# Patient Record
Sex: FEMALE | Race: WHITE | URBAN - METROPOLITAN AREA
[De-identification: names, ages, dates, MRNs, and addresses within clinical notes are randomized per-mention and may not be internally consistent; named-entity substitution may affect disease eponyms.]

---

## 2022-10-25 ENCOUNTER — APPOINTMENT (RX ONLY)
Dept: URBAN - METROPOLITAN AREA CLINIC 45 | Facility: CLINIC | Age: 67
Setting detail: DERMATOLOGY
End: 2022-10-25

## 2022-10-25 DIAGNOSIS — L57.0 ACTINIC KERATOSIS: ICD-10-CM

## 2022-10-25 DIAGNOSIS — L57.8 OTHER SKIN CHANGES DUE TO CHRONIC EXPOSURE TO NONIONIZING RADIATION: ICD-10-CM | Status: INADEQUATELY CONTROLLED

## 2022-10-25 DIAGNOSIS — B07.8 OTHER VIRAL WARTS: ICD-10-CM

## 2022-10-25 PROBLEM — D48.5 NEOPLASM OF UNCERTAIN BEHAVIOR OF SKIN: Status: ACTIVE | Noted: 2022-10-25

## 2022-10-25 PROCEDURE — 11103 TANGNTL BX SKIN EA SEP/ADDL: CPT | Mod: 59

## 2022-10-25 PROCEDURE — ? MEDICATION COUNSELING

## 2022-10-25 PROCEDURE — ? LIQUID NITROGEN

## 2022-10-25 PROCEDURE — 11102 TANGNTL BX SKIN SINGLE LES: CPT | Mod: 59

## 2022-10-25 PROCEDURE — 17110 DESTRUCTION B9 LES UP TO 14: CPT

## 2022-10-25 PROCEDURE — ? PRESCRIPTION

## 2022-10-25 PROCEDURE — ? ADDITIONAL NOTES

## 2022-10-25 PROCEDURE — ? COUNSELING

## 2022-10-25 PROCEDURE — ? BIOPSY BY SHAVE METHOD

## 2022-10-25 PROCEDURE — 17003 DESTRUCT PREMALG LES 2-14: CPT | Mod: 59

## 2022-10-25 PROCEDURE — 99204 OFFICE O/P NEW MOD 45 MIN: CPT | Mod: 25

## 2022-10-25 PROCEDURE — ? PRESCRIPTION MEDICATION MANAGEMENT

## 2022-10-25 PROCEDURE — 17000 DESTRUCT PREMALG LESION: CPT | Mod: 59

## 2022-10-25 RX ORDER — FLUOROURACIL 5 MG/G
CREAM TOPICAL BID
Qty: 40 | Refills: 1 | Status: ERX | COMMUNITY
Start: 2022-10-25

## 2022-10-25 RX ADMIN — FLUOROURACIL: 5 CREAM TOPICAL at 00:00

## 2022-10-25 ASSESSMENT — LOCATION ZONE DERM
LOCATION ZONE: FACE
LOCATION ZONE: FINGER

## 2022-10-25 ASSESSMENT — LOCATION DETAILED DESCRIPTION DERM
LOCATION DETAILED: RIGHT MID TEMPLE
LOCATION DETAILED: GLABELLA
LOCATION DETAILED: LEFT FOREHEAD
LOCATION DETAILED: RIGHT INFERIOR MEDIAL FOREHEAD
LOCATION DETAILED: RIGHT FOREHEAD
LOCATION DETAILED: RIGHT LATERAL FOREHEAD
LOCATION DETAILED: LEFT LATERAL FOREHEAD
LOCATION DETAILED: RIGHT MID DORSAL RING FINGER
LOCATION DETAILED: RIGHT MEDIAL MALAR CHEEK
LOCATION DETAILED: LEFT INFERIOR FOREHEAD
LOCATION DETAILED: LEFT MID TEMPLE
LOCATION DETAILED: RIGHT CENTRAL EYEBROW
LOCATION DETAILED: RIGHT MEDIAL FOREHEAD

## 2022-10-25 ASSESSMENT — LOCATION SIMPLE DESCRIPTION DERM
LOCATION SIMPLE: GLABELLA
LOCATION SIMPLE: RIGHT CHEEK
LOCATION SIMPLE: RIGHT RING FINGER
LOCATION SIMPLE: LEFT FOREHEAD
LOCATION SIMPLE: RIGHT EYEBROW
LOCATION SIMPLE: RIGHT TEMPLE
LOCATION SIMPLE: RIGHT FOREHEAD
LOCATION SIMPLE: LEFT TEMPLE

## 2022-10-25 NOTE — PROCEDURE: LIQUID NITROGEN
Show Aperture Variable?: Yes
Post-Care Instructions: I reviewed with the patient in detail post-care instructions. Patient is to wear sunprotection, and avoid picking at any of the treated lesions. Pt may apply Vaseline to crusted or scabbing areas.
Consent: The patient's consent was obtained including but not limited to risks of crusting, scabbing, blistering, scarring, darker or lighter pigmentary change, recurrence, incomplete removal and infection.
Render Note In Bullet Format When Appropriate: No
Duration Of Freeze Thaw-Cycle (Seconds): 0
Detail Level: Detailed
Medical Necessity Information: It is in your best interest to select a reason for this procedure from the list below. All of these items fulfill various CMS LCD requirements except the new and changing color options.
Medical Necessity Clause: This procedure was medically necessary because the lesions that were treated were:
Spray Paint Text: The liquid nitrogen was applied to the skin utilizing a spray paint frosting technique.

## 2022-10-25 NOTE — PROCEDURE: MEDICATION COUNSELING
Right renal angiogram Erythromycin Pregnancy And Lactation Text: This medication is Pregnancy Category B and is considered safe during pregnancy. It is also excreted in breast milk.

## 2022-10-25 NOTE — PROCEDURE: MEDICATION COUNSELING
Xelmelyz Pregnancy And Lactation Text: This medication is Pregnancy Category D and is not considered safe during pregnancy.  The risk during breast feeding is also uncertain.

## 2022-10-25 NOTE — PROCEDURE: PRESCRIPTION MEDICATION MANAGEMENT
Render In Strict Bullet Format?: No
Initiate Treatment: fluorouracil 5 % topical cream Bid\\nQuantity: 40.0 g  Days Supply: 30\\nSig: Apply a small amount to forehead bid x 2 weeks
Detail Level: Zone

## 2022-10-25 NOTE — HPI: SKIN LESION
What Type Of Note Output Would You Prefer (Optional)?: Bullet Format
How Severe Is Your Skin Lesion?: mild
Has Your Skin Lesion Been Treated?: not been treated
Is This A New Presentation, Or A Follow-Up?: Skin Lesions
Additional History: New patient seeing Cassandra today  (has been seen a while ago with NGD before EMA) \\n\\n\\nPatient is here for a few dry spots on forehead and her nose.  Pt declined a full body skin check.

## 2022-10-25 NOTE — PROCEDURE: ADDITIONAL NOTES
Past Medical History:   Diagnosis Date    AR (allergic rhinitis) 11/29/2012    Benign hypertension     Depression     Diverticulosis 2005    Esophageal polyp     Fibromyalgia     Gastric ulcer; benign 2007    GERD (gastroesophageal reflux disease) 11/29/2012    Hypercholesteremia     Hypothyroidism     Kidney stones     Obstructive sleep apnea on CPAP     Osteopenia     Rectal polyp     Skin cancer     pre skin cancer skin    Vulvovaginal melanosis        Past Surgical History:   Procedure Laterality Date    CHOLECYSTECTOMY  08/08/2012    CHOLECYSTECTOMY      COLONOSCOPY N/A 2/6/2017    Procedure: COLONOSCOPY;  Surgeon: Arturo Crane MD;  Location: [facility];  Service: Endoscopy;  Laterality: N/A;    FOOT FRACTURE SURGERY  2005    plate in right foot    GANGLION CYST EXCISION  1980    left wrist    HYSTERECTOMY      KNEE ARTHROSCOPY W/ ACL RECONSTRUCTION      bilateral     NASAL SEPTUM SURGERY      TEAR DUCT SURGERY  2006    left eye    TOTAL KNEE ARTHROPLASTY      bilateral    TOTAL VAGINAL HYSTERECTOMY  age 30    w/BSO    TUBAL LIGATION  age 25       Review of patient's allergies indicates:   Allergen Reactions    Cortisone Shortness Of Breath     Causes heart to race    Hair formula [mv,iron,min-folic acid-biotin]      dye    Levaquin [levofloxacin] Other (See Comments)     Tongue swelling and blisters    Codeine Nausea Only    Doxycycline      Other reaction(s): Unknown       No current facility-administered medications on file prior to encounter.      Current Outpatient Medications on File Prior to Encounter   Medication Sig    B-complex with vitamin C tablet Take 1 tablet by mouth once daily.     co-enzyme Q-10 30 mg capsule Take 30 mg by mouth once daily.     fluoxetine (PROZAC) 10 MG capsule Take 20 mg by mouth once daily.     fluticasone (FLONASE) 50 mcg/actuation nasal spray 1 spray by Each Nostril route daily as needed.     ipratropium (ATROVENT) 0.03 % nasal 
spray 2 sprays by Nasal route daily as needed.     Lactobacillus rhamnosus GG (CULTURELLE) 10 billion cell capsule Take 1 capsule by mouth once daily.    levomefolate calcium (DEPLIN) 7.5 mg Tab tablet Take 15 mg by mouth once daily.     losartan-hydrochlorothiazide 100-25 mg (HYZAAR) 100-25 mg per tablet Take 1 tablet by mouth once daily.     magnesium 30 mg tablet Take 30 mg by mouth once daily.     mv-mn/folic acid/vit [id number] (ALIVE ONCE DAILY WOMEN 50 PLUS ORAL) Take 1 tablet by mouth once daily.    potassium chloride SA (K-DUR,KLOR-CON) 10 MEQ tablet Take 10 mEq by mouth 2 (two) times daily.     terazosin (HYTRIN) 5 MG capsule Take 5 mg by mouth every evening.    TIROSINT 88 mcg Cap Take 88 mcg by mouth once daily.     amlodipine (NORVASC) 5 MG tablet Take 5 mg by mouth once daily.     cetirizine (ZYRTEC) 10 MG tablet Take 10 mg by mouth daily as needed.     escitalopram oxalate (LEXAPRO) 20 MG tablet     loratadine (CLARITIN) 10 mg tablet Take 10 mg by mouth once daily.    meloxicam (MOBIC) 15 MG tablet Take 1 tablet (15 mg total) by mouth once daily.    naproxen sodium (ANAPROX) 220 MG tablet Take 220 mg by mouth daily as needed.      Family History     Problem Relation (Age of Onset)    Heart disease Father    Kidney disease Mother        Tobacco Use    Smoking status: Never Smoker    Smokeless tobacco: Never Used   Substance and Sexual Activity    Alcohol use: No    Drug use: No    Sexual activity: Not Currently     Partners: Male     Birth control/protection: None     Review of Systems   Constitutional: Negative for activity change, appetite change, chills, diaphoresis, fatigue, fever and unexpected weight change.   HENT: Negative for congestion, ear pain, facial swelling, hearing loss, sore throat and trouble swallowing.    Eyes: Negative for pain and discharge.   Respiratory: Negative for cough, chest tightness, shortness of breath and wheezing.    Cardiovascular: Positive for chest 
pain. Negative for palpitations and leg swelling.   Gastrointestinal: Negative for abdominal pain, blood in stool, diarrhea, nausea and vomiting.   Endocrine: Negative for polydipsia, polyphagia and polyuria.   Genitourinary: Negative for difficulty urinating, dysuria, flank pain, frequency and urgency.   Musculoskeletal: Positive for back pain. Negative for arthralgias, joint swelling, neck pain and neck stiffness.   Skin: Negative for rash and wound.   Allergic/Immunologic: Negative for environmental allergies and immunocompromised state.   Neurological: Negative for dizziness, seizures, syncope, speech difficulty, weakness, light-headedness, numbness and headaches.   Hematological: Negative for adenopathy.   Psychiatric/Behavioral: Negative for sleep disturbance and suicidal ideas. The patient is nervous/anxious.    All other systems reviewed and are negative.    Objective:     Vital Signs (Most Recent):  Pulse: 68 (11/07/19 1600)  Resp: (!) 34 (11/07/19 1531)  BP: (!) 164/72 (11/07/19 1600)  SpO2: 97 % (11/07/19 1600) Vital Signs (24h Range):  Pulse:  [53-68] 68  Resp:  [18-34] 34  SpO2:  [95 %-99 %] 97 %  BP: (141-165)/(64-73) 164/72   Respirations on exam are 18  Weight: 76.7 kg (169 lb)  Body mass index is 31.93 kg/m².    Physical Exam   Constitutional: She is oriented to person, place, and time. She appears well-developed and well-nourished.   HENT:   Head: Normocephalic and atraumatic.   Eyes: Pupils are equal, round, and reactive to light. EOM are normal.   Neck: Normal range of motion. Neck supple.   Cardiovascular: Normal rate, regular rhythm, normal heart sounds and intact distal pulses.   No murmur heard.  Pulmonary/Chest: Effort normal and breath sounds normal. No stridor. No respiratory distress. She has no wheezes.   Abdominal: Soft. Bowel sounds are normal. She exhibits no distension. There is no tenderness.   Musculoskeletal: Normal range of motion. She exhibits tenderness.   Pain with palpation 
under left breast and left side, no visible wounds or erythema   Neurological: She is alert and oriented to person, place, and time.   Skin: Skin is warm and dry. Capillary refill takes less than 2 seconds.   Psychiatric:   Tearful on exam   Nursing note and vitals reviewed.        CRANIAL NERVES     CN III, IV, VI   Pupils are equal, round, and reactive to light.  Extraocular motions are normal.        Significant Labs:   CBC:   Recent Labs   Lab 11/07/19  1357   WBC 7.52   HGB 12.8   HCT 39.2        CMP:   Recent Labs   Lab 11/07/19  1357      K 3.7      CO2 27   *   BUN 14   CREATININE 1.0   CALCIUM 9.6   PROT 7.5   ALBUMIN 3.9   BILITOT 1.1*   ALKPHOS 65   AST 23   ALT 19   ANIONGAP 10   EGFRNONAA 56.0*     Cardiac Markers:   Recent Labs   Lab 11/07/19  1357   BNP 16     Troponin:   Recent Labs   Lab 11/07/19  1357   TROPONINI <0.030       Significant Imaging: CXR: I have reviewed all pertinent results/findings within the past 24 hours and my personal findings are:  no acute consolidation noted  I have reviewed all pertinent imaging results/findings within the past 24 hours.     X-ray Chest Ap Portable    Result Date: 11/7/2019  EXAMINATION: XR CHEST AP PORTABLE CLINICAL HISTORY: chest pain; FINDINGS: Portable chest at 1409 compared with 05/17/2018 shows normal cardiomediastinal silhouette. Lungs are clear. Pulmonary vasculature is normal. No acute osseous abnormality.     Negative chest. Electronically signed by: Radu Foote MD Date:    11/07/2019 Time:    14:14  EKG: Sinus bradycardia  Right bundle branch block  Abnormal ECG  No previous ECGs available  
Additional Notes: Patient consent was obtained to proceed with the visit and recommended plan of care after discussion of all risks and benefits, including the risks of COVID-19 exposure.
Detail Level: Simple

## 2022-10-25 NOTE — PROCEDURE: BIOPSY BY SHAVE METHOD
Detail Level: Detailed
Depth Of Biopsy: dermis
Was A Bandage Applied: Yes
Size Of Lesion In Cm: 0.6
X Size Of Lesion In Cm: 0
Biopsy Type: H and E
Biopsy Method: Dermablade
Anesthesia Type: 1% lidocaine with epinephrine
Anesthesia Volume In Cc (Will Not Render If 0): 0.5
Hemostasis: Drysol
Wound Care: Petrolatum
Dressing: bandage
Destruction After The Procedure: No
Type Of Destruction Used: Curettage
Curettage Text: The wound bed was treated with curettage after the biopsy was performed.
Cryotherapy Text: The wound bed was treated with cryotherapy after the biopsy was performed.
Electrodesiccation Text: The wound bed was treated with electrodesiccation after the biopsy was performed.
Electrodesiccation And Curettage Text: The wound bed was treated with electrodesiccation and curettage after the biopsy was performed.
Silver Nitrate Text: The wound bed was treated with silver nitrate after the biopsy was performed.
Lab: 6
Lab Facility: [id number]
Consent: Written consent was obtained and risks were reviewed including but not limited to scarring, infection, bleeding, scabbing, incomplete removal, nerve damage and allergy to anesthesia.
Post-Care Instructions: I reviewed with the patient in detail post-care instructions. Patient is to keep the biopsy site dry overnight, and then apply bacitracin twice daily until healed. Patient may apply hydrogen peroxide soaks to remove any crusting.
Notification Instructions: Patient will be notified of biopsy results. However, patient instructed to call the office if not contacted within 2 weeks.
Billing Type: Patient Bill
Information: Selecting Yes will display possible errors in your note based on the variables you have selected. This validation is only offered as a suggestion for you. PLEASE NOTE THAT THE VALIDATION TEXT WILL BE REMOVED WHEN YOU FINALIZE YOUR NOTE. IF YOU WANT TO FAX A PRELIMINARY NOTE YOU WILL NEED TO TOGGLE THIS TO 'NO' IF YOU DO NOT WANT IT IN YOUR FAXED NOTE.
Billing Type: Patient Bill

## 2024-10-29 NOTE — PROCEDURE: MEDICATION COUNSELING
Unit RN to OR RN Bexarotene Pregnancy And Lactation Text: This medication is Pregnancy Category X and should not be given to women who are pregnant or may become pregnant. This medication should not be used if you are breast feeding.